# Patient Record
Sex: MALE | Race: BLACK OR AFRICAN AMERICAN | NOT HISPANIC OR LATINO | Employment: FULL TIME | URBAN - METROPOLITAN AREA
[De-identification: names, ages, dates, MRNs, and addresses within clinical notes are randomized per-mention and may not be internally consistent; named-entity substitution may affect disease eponyms.]

---

## 2024-03-21 ENCOUNTER — HOSPITAL ENCOUNTER (EMERGENCY)
Facility: HOSPITAL | Age: 29
Discharge: HOME | End: 2024-03-21
Attending: STUDENT IN AN ORGANIZED HEALTH CARE EDUCATION/TRAINING PROGRAM
Payer: COMMERCIAL

## 2024-03-21 VITALS
WEIGHT: 223 LBS | RESPIRATION RATE: 18 BRPM | SYSTOLIC BLOOD PRESSURE: 144 MMHG | DIASTOLIC BLOOD PRESSURE: 76 MMHG | HEART RATE: 78 BPM | BODY MASS INDEX: 31.22 KG/M2 | HEIGHT: 71 IN | TEMPERATURE: 97.2 F | OXYGEN SATURATION: 98 %

## 2024-03-21 DIAGNOSIS — S05.91XA RIGHT EYE INJURY, INITIAL ENCOUNTER: Primary | ICD-10-CM

## 2024-03-21 PROCEDURE — 99283 EMERGENCY DEPT VISIT LOW MDM: CPT

## 2024-03-21 PROCEDURE — 2500000001 HC RX 250 WO HCPCS SELF ADMINISTERED DRUGS (ALT 637 FOR MEDICARE OP): Performed by: STUDENT IN AN ORGANIZED HEALTH CARE EDUCATION/TRAINING PROGRAM

## 2024-03-21 PROCEDURE — 99284 EMERGENCY DEPT VISIT MOD MDM: CPT | Performed by: STUDENT IN AN ORGANIZED HEALTH CARE EDUCATION/TRAINING PROGRAM

## 2024-03-21 RX ORDER — TETRACAINE HYDROCHLORIDE 5 MG/ML
1 SOLUTION OPHTHALMIC ONCE
Status: COMPLETED | OUTPATIENT
Start: 2024-03-21 | End: 2024-03-21

## 2024-03-21 RX ORDER — CARBOXYMETHYLCELLULOSE SODIUM 0.5 %
2 DROPPERETTE, SINGLE-USE DROP DISPENSER OPHTHALMIC (EYE) AS NEEDED
Qty: 30 EACH | Refills: 0 | Status: SHIPPED | OUTPATIENT
Start: 2024-03-21

## 2024-03-21 RX ADMIN — FLUORESCEIN SODIUM 1 STRIP: 1 STRIP OPHTHALMIC at 22:29

## 2024-03-21 RX ADMIN — TETRACAINE HYDROCHLORIDE 1 DROP: 5 SOLUTION OPHTHALMIC at 22:29

## 2024-03-21 ASSESSMENT — VISUAL ACUITY
OS: 20/30 -2
OD: 20/30 -2

## 2024-03-21 ASSESSMENT — COLUMBIA-SUICIDE SEVERITY RATING SCALE - C-SSRS
2. HAVE YOU ACTUALLY HAD ANY THOUGHTS OF KILLING YOURSELF?: NO
1. IN THE PAST MONTH, HAVE YOU WISHED YOU WERE DEAD OR WISHED YOU COULD GO TO SLEEP AND NOT WAKE UP?: NO
6. HAVE YOU EVER DONE ANYTHING, STARTED TO DO ANYTHING, OR PREPARED TO DO ANYTHING TO END YOUR LIFE?: NO

## 2024-03-22 NOTE — ED PROVIDER NOTES
History of Present Illness   CC: Eye Trauma     History provided by: Patient  Limitations to History: None    HPI:  Ketan Crockett is a 28 y.o. male presenting to the emergency department with concern for injury to the right eye.  He was cleaning up a cooler at work, and when bending over, etc. got poked in the right eye by a plastic packaging.  Has had multiple prior corneal abrasions, and is concerned about a recurrent 1.  He endorsed initial foreign body sensation, tearing, vision changes which have all subsided, now only mild irritation in the eye.  No vision changes currently.  No trauma to the left eye.    External Records Reviewed: None    Physical Exam   Triage vitals:  T 36.2 °C (97.2 °F)  HR 78  /76  RR 18  O2 98 %      Vital signs reviewed in nursing triage note, EMR flow sheets, and at patient's bedside.   General: Awake, alert, in no acute distress  Eyes: Visual acuity 20/30-2 OU.  Gaze conjugate.  Pupils equal, round, and reactive to light bilaterally.  Extraocular movements intact.  No foreign bodies noted in the right eye.  There is mild conjunctival injection in the right eye, normal-appearing left eye.  No fluorescein uptake in the right eye.  Pasquale sign negative.  HENT: Normo-cephalic, atraumatic. No stridor.  Resp: Breathing non-labored, speaking in full sentences.  Neuro: Ambulating without ataxia    ED Course & Medical Decision Making   ED Course:  Diagnoses as of 03/22/24 0114   Right eye injury, initial encounter       Differential diagnoses considered include but are not limited to: Corneal abrasion, foreign body, corneal ulcer, traumatic iritis    Social Determinants Limiting Care: None identified    MDM:  28 y.o. male presenting to the emergency department with concern for potential corneal abrasion after getting poked in the eye by a piece of plastic food packaging at work.  On arrival vital signs within normal limits.  Visual acuity is intact, preserved.  On slip examination,  there is no visible foreign body, no fluorescein uptake or Pasquale sign.  He has mild conjunctival irritation, likely from mild injury.  Will provide artificial tears.  Will refer to ophthalmology.  Will provide return precautions.  Discharged in stable condition.    Disposition   As a result of the work-up, patient was discharged home.  They were informed of their diagnosis and instructed to come back with any concerns or worsening of condition and was agreeable to the plan as discussed above.  The patient was given the opportunity to ask questions.  All of the patient's questions were answered.  The patient remained stable under my care.    Procedures   Procedures    Patient seen and discussed with ED attending physician.    Gilbert Cameron MD  PGY 3 Emergency Medicine         Daniel Cameron MD  Resident  03/22/24 0119

## 2024-03-22 NOTE — ED TRIAGE NOTES
PT to the ED for right eye injury. Frozen burger meat hit his eye while cleaning a cooler at work. No vision change. Endorsing increase watering of eye and pain.

## 2024-05-08 ENCOUNTER — OFFICE VISIT (OUTPATIENT)
Dept: PRIMARY CARE | Facility: HOSPITAL | Age: 29
End: 2024-05-08
Payer: COMMERCIAL

## 2024-05-08 VITALS
HEART RATE: 86 BPM | SYSTOLIC BLOOD PRESSURE: 126 MMHG | DIASTOLIC BLOOD PRESSURE: 83 MMHG | WEIGHT: 230 LBS | BODY MASS INDEX: 32.93 KG/M2 | TEMPERATURE: 97.5 F | OXYGEN SATURATION: 96 % | HEIGHT: 70 IN

## 2024-05-08 DIAGNOSIS — Z13.9 SCREENING DUE: Primary | ICD-10-CM

## 2024-05-08 LAB
ALBUMIN SERPL BCP-MCNC: 4.5 G/DL (ref 3.4–5)
ALP SERPL-CCNC: 91 U/L (ref 33–120)
ALT SERPL W P-5'-P-CCNC: 25 U/L (ref 10–52)
ANION GAP SERPL CALC-SCNC: 12 MMOL/L (ref 10–20)
AST SERPL W P-5'-P-CCNC: 17 U/L (ref 9–39)
BILIRUB SERPL-MCNC: 0.4 MG/DL (ref 0–1.2)
BUN SERPL-MCNC: 18 MG/DL (ref 6–23)
CALCIUM SERPL-MCNC: 9.4 MG/DL (ref 8.6–10.6)
CHLORIDE SERPL-SCNC: 105 MMOL/L (ref 98–107)
CO2 SERPL-SCNC: 27 MMOL/L (ref 21–32)
CREAT SERPL-MCNC: 0.85 MG/DL (ref 0.5–1.3)
EGFRCR SERPLBLD CKD-EPI 2021: >90 ML/MIN/1.73M*2
ERYTHROCYTE [DISTWIDTH] IN BLOOD BY AUTOMATED COUNT: 11.4 % (ref 11.5–14.5)
EST. AVERAGE GLUCOSE BLD GHB EST-MCNC: 80 MG/DL
GLUCOSE SERPL-MCNC: 82 MG/DL (ref 74–99)
HBA1C MFR BLD: 4.4 %
HBV SURFACE AB SER-ACNC: <3.1 MIU/ML
HCT VFR BLD AUTO: 46 % (ref 41–52)
HCV AB SER QL: NONREACTIVE
HGB BLD-MCNC: 14.4 G/DL (ref 13.5–17.5)
HIV 1+2 AB+HIV1 P24 AG SERPL QL IA: NONREACTIVE
MCH RBC QN AUTO: 28.1 PG (ref 26–34)
MCHC RBC AUTO-ENTMCNC: 31.3 G/DL (ref 32–36)
MCV RBC AUTO: 90 FL (ref 80–100)
NRBC BLD-RTO: 0 /100 WBCS (ref 0–0)
PLATELET # BLD AUTO: 272 X10*3/UL (ref 150–450)
POTASSIUM SERPL-SCNC: 4.1 MMOL/L (ref 3.5–5.3)
PROT SERPL-MCNC: 7.2 G/DL (ref 6.4–8.2)
RBC # BLD AUTO: 5.13 X10*6/UL (ref 4.5–5.9)
SODIUM SERPL-SCNC: 140 MMOL/L (ref 136–145)
TSH SERPL-ACNC: 0.63 MIU/L (ref 0.44–3.98)
WBC # BLD AUTO: 6.9 X10*3/UL (ref 4.4–11.3)

## 2024-05-08 PROCEDURE — 83036 HEMOGLOBIN GLYCOSYLATED A1C: CPT

## 2024-05-08 PROCEDURE — 99395 PREV VISIT EST AGE 18-39: CPT

## 2024-05-08 PROCEDURE — 84443 ASSAY THYROID STIM HORMONE: CPT

## 2024-05-08 PROCEDURE — 85027 COMPLETE CBC AUTOMATED: CPT

## 2024-05-08 PROCEDURE — 86706 HEP B SURFACE ANTIBODY: CPT

## 2024-05-08 PROCEDURE — 80053 COMPREHEN METABOLIC PANEL: CPT

## 2024-05-08 PROCEDURE — 87491 CHLMYD TRACH DNA AMP PROBE: CPT

## 2024-05-08 PROCEDURE — 90715 TDAP VACCINE 7 YRS/> IM: CPT | Mod: GC

## 2024-05-08 PROCEDURE — 87389 HIV-1 AG W/HIV-1&-2 AB AG IA: CPT

## 2024-05-08 PROCEDURE — 86317 IMMUNOASSAY INFECTIOUS AGENT: CPT

## 2024-05-08 PROCEDURE — 99213 OFFICE O/P EST LOW 20 MIN: CPT | Mod: GC

## 2024-05-08 PROCEDURE — 86765 RUBEOLA ANTIBODY: CPT

## 2024-05-08 PROCEDURE — 86803 HEPATITIS C AB TEST: CPT

## 2024-05-08 PROCEDURE — 86735 MUMPS ANTIBODY: CPT

## 2024-05-08 PROCEDURE — 36415 COLL VENOUS BLD VENIPUNCTURE: CPT

## 2024-05-08 ASSESSMENT — ENCOUNTER SYMPTOMS
LOSS OF SENSATION IN FEET: 0
OCCASIONAL FEELINGS OF UNSTEADINESS: 0
DEPRESSION: 1

## 2024-05-08 ASSESSMENT — PAIN SCALES - GENERAL: PAINLEVEL: 0-NO PAIN

## 2024-05-08 ASSESSMENT — PATIENT HEALTH QUESTIONNAIRE - PHQ9
10. IF YOU CHECKED OFF ANY PROBLEMS, HOW DIFFICULT HAVE THESE PROBLEMS MADE IT FOR YOU TO DO YOUR WORK, TAKE CARE OF THINGS AT HOME, OR GET ALONG WITH OTHER PEOPLE: NOT DIFFICULT AT ALL
1. LITTLE INTEREST OR PLEASURE IN DOING THINGS: SEVERAL DAYS
SUM OF ALL RESPONSES TO PHQ9 QUESTIONS 1 AND 2: 2
2. FEELING DOWN, DEPRESSED OR HOPELESS: SEVERAL DAYS

## 2024-05-08 NOTE — PROGRESS NOTES
"Ketan Crockett is a 28 y.o. M seen in Clinic  on 05/08/24 for routine care, as well as for Physical exam for job.    HPI:   Healthy, no complaints, exercising daily. Smoker of 1PPD since he was 17.   Alcohol and [place] occasionally      Acute Concerns:   No concerns    ROS:   12 Points ROS is negative    Chronic Medical Conditions:   none    Past Medical History: none  No past medical history on file.      Past Surgical History: none    Medications:   none  Current Outpatient Medications:     lubricating eye drops (Lubricant Eye Drops) ophthalmic solution, Administer 2 drops into the right eye if needed for dry eyes., Disp: 30 each, Rfl: 0      Allergies:   No Known Allergies      Immunizations: Due for tdap    Family History: insignificant    Social History:   Home/Living Situation/Falls/Safety Assessment:   Education/Employment/Work/Vocational: nutritionist, applying for childcare  Activities: jogging  Drug Use: Marijuana  Diet: regular  Depression/Anxiety: none  Sexuality/Contraception/Menstrual : multiple sexual partners          Visit Vitals  /83   Pulse 86   Temp 36.4 °C (97.5 °F) (Temporal)   Ht 1.778 m (5' 10\")   Wt 104 kg (230 lb)   SpO2 96%   BMI 33.00 kg/m²   Smoking Status Every Day   BSA 2.27 m²        PHYSICAL EXAM:   General: well appearing, NAD, pleasant and engaged in encounter    HEENT: NCAT, MMM  CV: RRR, no m/r/g  PULM: CTAB, non-labored respirations   ABD: soft, NT, ND, + bowel sounds   : no suprapubic or CVA tenderness   EXT: WWP, no significant edema   SKIN: no rashes noted   NEURO: A&Ox4, symmetric facies, no gross motor or sensory deficits, normal gait  PSYCH: pleasant mood, appropriate affect     Assessment/Plan    Ketan Crockett is a 28 y.o. 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    1. Screening due  - Rubeola Antibody, IgG; Future  - Rubella antibody, IgG; Future  - Mumps Antibody, IgG; " Future  - C. trachomatis / N. gonorrhoeae, DNA probe; Future  - HIV 1/2 Antigen/Antibody Screen with Reflex to Confirmation; Future  - Hepatitis C antibody; Future  - Hepatitis B surface antibody; Future  - CBC; Future  - Comprehensive metabolic panel; Future  - Hemoglobin A1c; Future  - TSH; Future  - Hemoglobin A1c; Future       #Chronic Medical Conditions  none    #Health Maintenance    Cancer Screening  None due    Laboratory Screening  - Lipid Screen: ordered  - ASCVD Score: 10%  - A1C, glucose screen: Oredered  - STI, HIV, Hep B screen: STD, HIV , hepatitis B sent  - Hep C screen: ordered        Immunizations:   - Influenza  - COVID  - Tdap--Today 5/8/2024  - Menactra, Men B  - HPV  - Prevnar, Pneumovax  - Shingrix        Return to clinic in 1 year for follow-up.    Patient Discussion:    Please call back the office with any questions at [phone]. In the case of an emergency, please call 911 or go to the nearest Emergency Department.      Nicki Koroma MD  Internal Medicine  Phone [phone]  Fax [phone]

## 2024-05-08 NOTE — PATIENT INSTRUCTIONS
Dear Mr Crockett,  Thanks for your visit.  You came today for physical for a job.  Your exam including your vitlas (Blood pressure, Oxygen, T) within normal  We ordered STD screening and other screening labs for immunity, diabetes and cholesterol.  You received tdap vaccine.  We advice you to quit smoking and we can help you whenever you are interested.    See you gain in 1 year and good luck in your new job!

## 2024-05-09 ENCOUNTER — DOCUMENTATION (OUTPATIENT)
Dept: INTERNAL MEDICINE | Facility: HOSPITAL | Age: 29
End: 2024-05-09
Payer: COMMERCIAL

## 2024-05-09 LAB
C TRACH RRNA SPEC QL NAA+PROBE: NEGATIVE
MEV IGG SER QL IA: POSITIVE
MUMPS IGG ANTIBODY INDEX: 0.6 IA
MUV IGG SER IA-ACNC: NEGATIVE
N GONORRHOEA DNA SPEC QL PROBE+SIG AMP: NEGATIVE
RUBEOLA IGG ANTIBODY INDEX: 1.5 IA
RUBV IGG SERPL IA-ACNC: 0.6 IA
RUBV IGG SERPL QL IA: NEGATIVE

## 2024-08-14 ENCOUNTER — APPOINTMENT (OUTPATIENT)
Dept: OPHTHALMOLOGY | Facility: CLINIC | Age: 29
End: 2024-08-14
Payer: COMMERCIAL

## 2024-08-14 DIAGNOSIS — H53.8 BLURRED VISION: Primary | ICD-10-CM

## 2024-08-14 DIAGNOSIS — H47.393 OPTIC NERVE CUPPING OF BOTH EYES: ICD-10-CM

## 2024-08-14 DIAGNOSIS — H40.003 GLAUCOMA SUSPECT OF BOTH EYES: ICD-10-CM

## 2024-08-14 DIAGNOSIS — H52.223 REGULAR ASTIGMATISM OF BOTH EYES: ICD-10-CM

## 2024-08-14 DIAGNOSIS — H52.13 MYOPIA, BILATERAL: ICD-10-CM

## 2024-08-14 LAB
AVERAGE RNFL TODAY (OD): 106
AVERAGE RNFL TODAY (OS): 100
C/D RATIO TODAY (OD): 0.68
C/D RATIO TODAY (OS): 0.68

## 2024-08-14 PROCEDURE — 92015 DETERMINE REFRACTIVE STATE: CPT | Performed by: OPHTHALMOLOGY

## 2024-08-14 PROCEDURE — 76514 ECHO EXAM OF EYE THICKNESS: CPT | Performed by: OPHTHALMOLOGY

## 2024-08-14 PROCEDURE — 92133 CPTRZD OPH DX IMG PST SGM ON: CPT | Performed by: OPHTHALMOLOGY

## 2024-08-14 PROCEDURE — 92004 COMPRE OPH EXAM NEW PT 1/>: CPT | Performed by: OPHTHALMOLOGY

## 2024-08-14 ASSESSMENT — EXTERNAL EXAM - RIGHT EYE: OD_EXAM: NORMAL

## 2024-08-14 ASSESSMENT — VISUAL ACUITY
OD_SC: 20/25
OS_SC: 20/25
OD_PH_SC: 20/20
OS_PH_SC: 20/20
METHOD: SNELLEN - LINEAR

## 2024-08-14 ASSESSMENT — REFRACTION_MANIFEST
OD_CYLINDER: -1.00
OS_CYLINDER: -1.25
OD_SPHERE: +0.75
OS_CYLINDER: -1.25
OS_SPHERE: +0.00
OS_AXIS: 043
OD_AXIS: 160
OD_SPHERE: -0.25
OS_SPHERE: -0.75
OS_AXIS: 043
OD_AXIS: 160
OD_CYLINDER: -1.00
METHOD_AUTOREFRACTION: 1

## 2024-08-14 ASSESSMENT — CUP TO DISC RATIO
OS_RATIO: 0.65
OD_RATIO: 0.65

## 2024-08-14 ASSESSMENT — PACHYMETRY
OS_CT(UM): 528
OD_CT(UM): 541

## 2024-08-14 ASSESSMENT — TONOMETRY
OS_IOP_MMHG: 16
IOP_METHOD: GOLDMANN APPLANATION
OD_IOP_MMHG: 17

## 2024-08-14 ASSESSMENT — SLIT LAMP EXAM - LIDS
COMMENTS: NORMAL
COMMENTS: NORMAL

## 2024-08-14 ASSESSMENT — EXTERNAL EXAM - LEFT EYE: OS_EXAM: NORMAL

## 2024-08-14 NOTE — PROGRESS NOTES
Assessment/Plan   Diagnoses and all orders for this visit:  Blurred vision  Improved with refraction    Optic nerve cupping of both eyes  Glaucoma suspect of both eyes  -     OCT, Optic Nerve - OU - Both Eyes  Pachys: 541/528  continue to monitor  -suspicious optic nerves, pt was advised that glaucoma can be a potentially blinding disease and recommend regular glaucoma checks in the future     Myopia, bilateral  Regular astigmatism of both eyes  Refractive error  -give Rx for new glasses    Return in  3  month(s) for follow up or sooner if having any problems  Visual field (VF) at next visit

## 2024-12-12 ENCOUNTER — APPOINTMENT (OUTPATIENT)
Dept: OPHTHALMOLOGY | Facility: CLINIC | Age: 29
End: 2024-12-12
Payer: COMMERCIAL

## 2024-12-12 DIAGNOSIS — H40.003 GLAUCOMA SUSPECT OF BOTH EYES: Primary | ICD-10-CM

## 2024-12-12 DIAGNOSIS — H53.8 BLURRED VISION: ICD-10-CM

## 2024-12-12 DIAGNOSIS — H47.393 OPTIC NERVE CUPPING OF BOTH EYES: ICD-10-CM

## 2024-12-12 PROCEDURE — 92083 EXTENDED VISUAL FIELD XM: CPT | Performed by: OPHTHALMOLOGY

## 2024-12-12 PROCEDURE — 92012 INTRM OPH EXAM EST PATIENT: CPT | Performed by: OPHTHALMOLOGY

## 2024-12-12 ASSESSMENT — EXTERNAL EXAM - RIGHT EYE: OD_EXAM: NORMAL

## 2024-12-12 ASSESSMENT — TONOMETRY
OS_IOP_MMHG: 13
OD_IOP_MMHG: 12
IOP_METHOD: GOLDMANN APPLANATION

## 2024-12-12 ASSESSMENT — EXTERNAL EXAM - LEFT EYE: OS_EXAM: NORMAL

## 2024-12-12 ASSESSMENT — VISUAL ACUITY
OS_SC: 20/25
OD_SC: 20/20-2
METHOD: SNELLEN - LINEAR

## 2024-12-12 ASSESSMENT — PACHYMETRY
OD_CT(UM): 541
OS_CT(UM): 528

## 2024-12-12 ASSESSMENT — ENCOUNTER SYMPTOMS: EYES NEGATIVE: 1

## 2024-12-12 ASSESSMENT — SLIT LAMP EXAM - LIDS
COMMENTS: NORMAL
COMMENTS: NORMAL

## 2024-12-12 NOTE — PROGRESS NOTES
Assessment/Plan   Diagnoses and all orders for this visit:  Glaucoma suspect of both eyes  Intraocular pressure (IOP)'s are better today  Visual field (VF) is unreliable- lots of fixation losses and false positives  -discussed the pathophysiology with patient and the need for close monitoring since it can be a blinding disease- he seemed to understand the need for close survaillance    Blurred vision  Optic nerve cupping of both eyes  continue to monitor     Return for a dilated exam in  6  months or sooner if having any problems  Visual field (VF) and OCT at next visit

## 2025-05-08 ENCOUNTER — APPOINTMENT (OUTPATIENT)
Dept: PRIMARY CARE | Facility: HOSPITAL | Age: 30
End: 2025-05-08
Payer: COMMERCIAL

## 2025-06-12 ENCOUNTER — APPOINTMENT (OUTPATIENT)
Dept: OPHTHALMOLOGY | Facility: CLINIC | Age: 30
End: 2025-06-12
Payer: COMMERCIAL